# Patient Record
(demographics unavailable — no encounter records)

---

## 2024-11-07 NOTE — HISTORY OF PRESENT ILLNESS
[FreeTextEntry1] : Ms. Mckinnon is a 78yo F with PMHx of pAF ( on Xarelto), HLD, DM, hypothyroidism, asthma who presents for follow up. Her PMD is Dr. Herrera who is retiring the next month. She previously followed with Dr Lincoln in the past. Denies chest pain, has occasional SOB which she attributes to asthma. Patient had an episode of Afib when she self discontinued Metoprolol. She is back on Metoprolol, remains in SR. She swims almost daily. DM is well controlled. Cholesterol is at goal. Normotensive at the time of the visit. She has left leg lymphedema which she uses a pump for at home about once a month.  10/26/23- Patient presents for F/U visit. She feels good. Cholesterol is near goal:  ,  on diet /exercise. Patient still swims daily. Compliant with medications. Well controlled diabetes. Patient is planning ankle surgery some time in January.  05/02/24-Patient feeling well. She didn't go for surgery due to many different surgeries and recovery time. She feels well overall.  11/07/24-Patient feeling well overall. She is exercising regularly but with significant OA.

## 2024-11-07 NOTE — ASSESSMENT
[FreeTextEntry1] : Paroxysmal AF: Currently in NSR. [id number] - will continue Xarelto 20 mg PO daily - will continue Metoprolol 50 mg PO daily  Hx of lymphedema - patient takes Furosemide 40 mg PO PRN - continue using lymphedema pump  HLD: , , trig 114->, TG 91, HDL 62,  (03/24) -Patient is resistant to taking statins.  -Despite discussion of benefits.  - encouraged patient to continue healthy exercise and eating habits, focusing on Mediterranean style of eating and aiming for the recommended 150 minutes per week of moderate physical activity.  DM: HA1c 7.9% (03/24) -Patient trying to reduce with exercise.  -Will have rechecked soon with PMD.  -Patient would like to defer statins.   Carotid bruit:  -Check US Carotid:   OA:  -Acetaminophen and trial of Tramadol 50mg PO BID PRN for pain.   Follow up in 6 months

## 2024-11-07 NOTE — REASON FOR VISIT
[FreeTextEntry1] : Diagnostic Tests: --------------------- EK24-NSR. Normal EKG.  24-NSR. Normal EKG.  10/26/23-NSR. Normal EKG.  23-NSR. Normal EKG.  --------------------- Echo:  10/10/24-TTE: EF 69%. Trace MR. PASP 23 mmHg.  22-TTE: EF 57%. Mod LAE. Mild MR, TR. PASP 44 mmHg. Ascending aorta 3.6cm.  ------------- Holter: -22- SR  bpm with average 73 bpm, PVC/PACs burden 1%. NSVT  longest 8 beats, fastest 130 bpm, SVT x2 fastest 159 bpm.  ------------ Stress:  22-Lexiscan MPI: Negative for ischemia.  20-ETT: was submaximal, inconclusive

## 2024-12-03 NOTE — DISCUSSION/SUMMARY
[FreeTextEntry1] : Urge Incontinence Patient happy with Myrbetriq 50 mg QD. Refills provided. 90 days supply with 1 refills. Urine culture obtained. (clean catch) Will follow up. Will treat accordingly if necessary Precautions reviewed. Will return in 6 months for follow up or earlier if she has any issues.

## 2024-12-03 NOTE — HISTORY OF PRESENT ILLNESS
[FreeTextEntry1] : Patient is here for 2 months med check for urge incontinence. Last seen on 10/1/24 for med check.   s/p c/s hx of DM (AIC 6.5), hx of insufficiency of L posterior tibial tendon hx of a fib, on xarelto Gemtesa-too expensive s/p Vesicare 5 mg: not enough benefit s/p Vesicare 10mg QD, helped well 90% but caused severe dry mouth.  s/p Myrbetriq 25mg: no benefit Gemtesa 75mg QD: min benefit, 30% only Myrbetriq 50mg QD:  Today, patient states she is happy with Myrbetriq 50mg QD and is noticing improvement. Feels about 60% improvement, better than all other medications. Denies side effects. Patient does not feel she has an infection. Would like to test her urine today, states it hasn't been checked anywhere in 2 years, and she feels that it's dark. Denies any UTI symptoms. Does not want cath.

## 2024-12-03 NOTE — REASON FOR VISIT
[TextEntry] : Reason for visit: 2 Month Medication Check Voids per day: 4  Voids per night: 1-2  Urge incontinence: Yes Stress incontinence: No Constipation: Occasionally Fecal incontinence: Occasionally Vaginal bulge: No

## 2024-12-03 NOTE — COUNSELING
[FreeTextEntry1] : If you feel like you have an infection it is important for you to call our office and we will arrange testing of your urine.  We will contact you if the urine results are abnormal.  Please continue taking Myrbetriq 50mg for frequency. Refills sent to your pharmacy.  Please call my office if you have any issues with the cost or side effects of the medication.   Schedule a 6 months follow up med check appointment with JOSEFINA Chavarria.

## 2025-01-10 NOTE — REASON FOR VISIT
[FreeTextEntry1] : Diagnostic Tests: --------------------- EK24-NSR. Normal EKG.  24-NSR. Normal EKG.  10/26/23-NSR. Normal EKG.  23-NSR. Normal EKG.  --------------------- Echo:  01/10/25-TTE: EF 54%. Mild MAC.  10/10/24-TTE: EF 69%. Trace MR. PASP 23 mmHg.  22-TTE: EF 57%. Mod LAE. Mild MR, TR. PASP 44 mmHg. Ascending aorta 3.6cm.  ------------- Holter: -22- SR  bpm with average 73 bpm, PVC/PACs burden 1%. NSVT  longest 8 beats, fastest 130 bpm, SVT x2 fastest 159 bpm.  ------------ Stress:  22-Lexiscan MPI: Negative for ischemia.  20-ETT: was submaximal, inconclusive

## 2025-01-10 NOTE — HISTORY OF PRESENT ILLNESS
[FreeTextEntry1] : Ms. Mckinnon is a 78yo F with PMHx of pAF ( on Xarelto), HLD, DM, hypothyroidism, asthma who presents for follow up. Her PMD is Dr. Herrera who is retiring the next month. She previously followed with Dr Lincoln in the past. Denies chest pain, has occasional SOB which she attributes to asthma. Patient had an episode of Afib when she self discontinued Metoprolol. She is back on Metoprolol, remains in SR. She swims almost daily. DM is well controlled. Cholesterol is at goal. Normotensive at the time of the visit. She has left leg lymphedema which she uses a pump for at home about once a month.  10/26/23- Patient presents for F/U visit. She feels good. Cholesterol is near goal:  ,  on diet /exercise. Patient still swims daily. Compliant with medications. Well controlled diabetes. Patient is planning ankle surgery some time in January.  05/02/24-Patient feeling well. She didn't go for surgery due to many different surgeries and recovery time. She feels well overall.  11/07/24-Patient feeling well overall. She is exercising regularly but with significant OA.  01/10/25-Patient admitted for Flu/PNA and AF with RVR. She converted to NSR and was discharged home. She has been feeling well since discharge.

## 2025-01-10 NOTE — ASSESSMENT
[FreeTextEntry1] : Paroxysmal AF: Currently in NSR. [id number]=0 - will continue Xarelto 20 mg PO daily - will continue Metoprolol 50 mg PO daily  Hx of lymphedema - patient takes Furosemide 40 mg PO PRN - continue using lymphedema pump  HLD: , , trig 114->, TG 91, HDL 62,  (03/24) -Patient is resistant to taking statins.  -Despite discussion of benefits.  - encouraged patient to continue healthy exercise and eating habits, focusing on Mediterranean style of eating and aiming for the recommended 150 minutes per week of moderate physical activity.  DM: HA1c 7.9% (03/24) -Patient trying to reduce with exercise.  -Will have rechecked soon with PMD.  -Patient would like to defer statins.   Carotid bruit:  -Check US Carotid:   OA:  -Acetaminophen and trial of Tramadol 50mg PO BID PRN for pain.   Follow up in May

## 2025-04-03 NOTE — ASSESSMENT
[FreeTextEntry1] : Paroxysmal AF: Currently in NSR. [id number] - will continue Xarelto 20 mg PO daily - will continue Metoprolol 50 mg PO daily  Hx of lymphedema - patient takes Furosemide 40 mg PO PRN - continue using lymphedema pump  HLD: , , trig 114->, TG 91, HDL 62,  (03/24) -Patient is resistant to taking statins.  -Despite discussion of benefits.  - encouraged patient to continue healthy exercise and eating habits, focusing on Mediterranean style of eating and aiming for the recommended 150 minutes per week of moderate physical activity.  DM: HA1c 7.9% (03/24) -Patient trying to reduce with exercise.  -Will have rechecked soon with PMD.  -Patient would like to defer statins.   Carotid bruit:  -Check US Carotid:   OA:  -Acetaminophen and trial of Tramadol 50mg PO BID PRN for pain.   Follow up in May

## 2025-04-17 NOTE — PHYSICAL EXAM
[Chaperoned Physical Exam] : A chaperone was present in the examining room during all aspects of the physical examination. [MA] : MA [No Acute Distress] : in no acute distress [Well developed] : well developed [Well Nourished] : ~L well nourished [FreeTextEntry2] : Michelle [FreeTextEntry1] :   Indication:  Urge urinary incontinence Urethra was prepped in sterile fashion and then a sterile non indwelling catheter (14F) was used by me to drain the bladder.  The patient tolerated the procedure well.  Void: 60 cc PVR: 30 cc

## 2025-04-17 NOTE — REASON FOR VISIT
[TextEntry] : Reason for visit: Medication Follow Up Voids per day: Every hour   Voids per night: 2-3   Urge incontinence: Yes Stress incontinence: Yes (+) cough (+) sneeze Constipation: No Fecal incontinence: No Vaginal bulge: No

## 2025-04-17 NOTE — DISCUSSION/SUMMARY
[FreeTextEntry1] : UUI: -Urine cx and UA ordered to r/o UTI. -Offered pt to switch to Gemtesa.  -Bladder diet and fluid intake modification discussed. -Pelvic floor PT offered - pt declined. -3rd line treatment options discussed, pt is considering Botox injections if bladder diet and medication fail.  Atrophic Vaginitis: +atrophy on the exam. We reviewed the risks, benefits, alternatives and indications of local estrogen therapy and I gave her a handout that covers this information. She does opt to begin this therapy. I have given her a prescription and specific instructions on how to use the estrogen cream, applied with a finger at a low dose for urogenital atrophy.  All questions addressed. RTC in 6-8 weeks for med check, or sooner PRN

## 2025-04-17 NOTE — HISTORY OF PRESENT ILLNESS
[FreeTextEntry1] : Patient presents for follow-up visit. She has history of urge incontinence. She was last seen on 12/3/24 for med check.  s/p c/s hx of DM (AIC 6.5), hx of insufficiency of L posterior tibial tendon hx of a fib, on xarelto Gemtesa-too expensive s/p Vesicare 5 mg: not enough benefit s/p Vesicare 10mg QD, helped well 90% but caused severe dry mouth.  s/p Myrbetriq 25mg: no benefit Gemtesa 75mg QD: min benefit, 30% only Myrbetriq 50mg QD: was working until she started Jardiance about 6 weeks ago.  Today, patient states that her symptoms of urge incontinence and frequency got worse since she started Jardiance about 6 weeks (prescribed by her PCP). She denies symptoms of UTI. Denies vaginal bleeding or pain.

## 2025-04-17 NOTE — END OF VISIT
[TextEntry] : I, Mali Packer, ANJELICA, spent 30 minutes face to face with the patient. This excludes cath

## 2025-04-17 NOTE — COUNSELING
[FreeTextEntry1] : We will contact you if your urine results are abnormal.  If you feel like you have an infection it is important for you to call our office and we will arrange testing of your urine.   Please STOP taking Myrbetriq 50 mg.   Please begin taking Gemtesa 75 mg. It takes up to 6 weeks to go into full effect.   Please start to apply a pea size amount of the cream on your finger (NOT APPLICATOR) and insert into the vagina three nights per week. (Monday, Wednesday, and Friday night)  Please call my office if you have any issues with the cost or side effects of the medications.  Please decrease or stop the use of the followin. Coffee (Caffeinated and decaffeinated)  2. Teas (Caffeinated, decaffeinated, Iced tea, and green teas  3. All sodas (Caffeinated, decaffeinated, energy drinks) 4. All carbonated drinks including seltzer water 5. All citric fruit juices  6. Water with lemon or lime  7. Spicy foods  8. Tomato Sauce based foods  9. Chocolate and chocolate containing products  10. All alcohol  For 4-5 days, cut one item from the list out of your diet.   After 4-5 days, if it makes a difference, you have to decide if you want to keep it in or out of your diet.  If it does not make a difference, you may add it back into your diet and remove another item for another 4-5 days.  Avoid liquid intake 2 hours before bedtime. It is recommended that you take sips of water to take your medications before bedtime.   Elevate your legs throughout the day, particularly 2 hours before bedtime.     Schedule 6-8 weeks follow up med check appointment with ANJELICA Samson, or sooner if you have any issues.

## 2025-05-08 NOTE — REASON FOR VISIT
[FreeTextEntry1] : Diagnostic Tests: --------------------- EK25-NSR. Normal EKG.  24-NSR. Normal EKG.  24-NSR. Normal EKG.  10/26/23-NSR. Normal EKG.  23-NSR. Normal EKG.  --------------------- Echo:  01/10/25-TTE: EF 54%. Mild MAC.  10/10/24-TTE: EF 69%. Trace MR. PASP 23 mmHg.  22-TTE: EF 57%. Mod LAE. Mild MR, TR. PASP 44 mmHg. Ascending aorta 3.6cm.  ------------- Holter: -22- SR  bpm with average 73 bpm, PVC/PACs burden 1%. NSVT  longest 8 beats, fastest 130 bpm, SVT x2 fastest 159 bpm.  ------------ Stress:  22-Lexiscan MPI: Negative for ischemia.  20-ETT: was submaximal, inconclusive ----------------- US:  25-Carotid: No stenosis B/L ICAs.

## 2025-05-08 NOTE — ASSESSMENT
[FreeTextEntry1] : Paroxysmal AF: Currently in NSR. [id number] - will continue Xarelto 20 mg PO daily - will continue Metoprolol 50 mg PO daily  Hx of lymphedema - patient takes Furosemide 40 mg PO PRN - continue using lymphedema pump  HLD: , , trig 114->, TG 91, HDL 62,  (03/24)->, TG 97, HDL 67,  (03/25) -Patient is resistant to taking statins. She does not want to add additional medications.  -Despite discussion of benefits.  - encouraged patient to continue healthy exercise and eating habits, focusing on Mediterranean style of eating and aiming for the recommended 150 minutes per week of moderate physical activity.  DM: HA1c 7.9% (03/24)->7.4% (03/25) -Patient trying to reduce with exercise.  -Will have rechecked soon with PMD.  -Patient would like to defer statins.   Carotid bruit:  -No PHIL seen.   OA:  -Acetaminophen and trial of Tramadol 50mg PO BID PRN for pain.   Follow up in 6 months -Check labs.

## 2025-05-08 NOTE — HISTORY OF PRESENT ILLNESS
[TextBox_4] : - Chief Complaint (CC) : Follow-up for sleep apnea, asthma, and allergic rhinitis; patient also mentions back pain. - History of Present Illness : Loulou , a female patient, presents for a follow-up appointment regarding her sleep apnea, asthma, and allergic rhinitis. She reports that her back is causing her significant discomfort, stating 'my back is killing me.' Regarding her asthma medication, she notes that it is helping 'a little bit,' but she is currently on a low dose. The patient has a history of being on higher doses in the past. She experiences coughing fits that she can't stop, particularly during allergy season. The patient describes herself as 'allergic to everything,' including trees, and notes that this year has been particularly bad for tree allergies. Concerning her sleep apnea, the patient expresses frustration with her CPAP machine, stating 'I hate it,' but acknowledges using it for 4-5 hours per night. She reports feeling that the supply company sends her too many supplies too frequently. - Past Medical History : Sleep apnea, Asthma, Allergic rhinitis

## 2025-05-08 NOTE — HISTORY OF PRESENT ILLNESS
[FreeTextEntry1] : Ms. Mckinnon is a 78yo F with PMHx of pAF ( on Xarelto), HLD, DM, hypothyroidism, asthma who presents for follow up. Her PMD is Dr. Herrera who is retiring the next month. She previously followed with Dr Lincoln in the past. Denies chest pain, has occasional SOB which she attributes to asthma. Patient had an episode of Afib when she self discontinued Metoprolol. She is back on Metoprolol, remains in SR. She swims almost daily. DM is well controlled. Cholesterol is at goal. Normotensive at the time of the visit. She has left leg lymphedema which she uses a pump for at home about once a month.  10/26/23- Patient presents for F/U visit. She feels good. Cholesterol is near goal:  ,  on diet /exercise. Patient still swims daily. Compliant with medications. Well controlled diabetes. Patient is planning ankle surgery some time in January.  05/02/24-Patient feeling well. She didn't go for surgery due to many different surgeries and recovery time. She feels well overall.  11/07/24-Patient feeling well overall. She is exercising regularly but with significant OA.  01/10/25-Patient admitted for Flu/PNA and AF with RVR. She converted to NSR and was discharged home. She has been feeling well since discharge.  05/08/25-Patient with worsening back pain.

## 2025-05-08 NOTE — ASSESSMENT
[FreeTextEntry1] : ssessment and Plan: - Asthma : Patient's asthma is not optimally controlled with the current medication regimen. The low dose of inhaler is providing only minimal relief. - Increase asthma inhaler dose from 100mcg to 250mcg  - Continue using rescue inhaler (albuterol) as needed  - Consider decreasing dose back to 100mcg after allergy season if symptoms improve  - Follow up in fall (October/November) or sooner if issues arise  - Allergic Rhinitis : Patient experiences significant allergy symptoms, particularly during tree pollen season. Current nasal spray regimen is not providing optimal relief. - Continue Azelastine nasal spray at night  - Continue Flonase nasal spray in the morning  - Add saline nasal rinse to routine (morning, night, and after outdoor exposure)  - Patient education on proper nasal rinse technique  - Follow up in fall (October/November) or sooner if issues arise  - Obstructive Sleep Apnea : Patient is using CPAP for 4-5 hours per night, which is considered adequate for reducing cardiovascular risks associated with sleep apnea. - Encourage continued use of CPAP for at least 4-5 hours per night  - Advise patient to contact supply company to reduce frequency of supply shipments  - Educate patient on the importance of CPAP use for cardiovascular health